# Patient Record
Sex: MALE | Race: WHITE | Employment: FULL TIME | URBAN - METROPOLITAN AREA
[De-identification: names, ages, dates, MRNs, and addresses within clinical notes are randomized per-mention and may not be internally consistent; named-entity substitution may affect disease eponyms.]

---

## 2020-01-11 ENCOUNTER — HOSPITAL ENCOUNTER (EMERGENCY)
Age: 65
Discharge: HOME | End: 2020-01-11
Attending: EMERGENCY MEDICINE
Payer: COMMERCIAL

## 2020-01-11 ENCOUNTER — APPOINTMENT (OUTPATIENT)
Dept: GENERAL RADIOLOGY | Age: 65
End: 2020-01-11
Payer: COMMERCIAL

## 2020-01-11 VITALS
TEMPERATURE: 98 F | DIASTOLIC BLOOD PRESSURE: 77 MMHG | OXYGEN SATURATION: 96 % | HEART RATE: 74 BPM | SYSTOLIC BLOOD PRESSURE: 114 MMHG | WEIGHT: 220 LBS | RESPIRATION RATE: 16 BRPM

## 2020-01-11 LAB
ANION GAP SERPL CALCULATED.3IONS-SCNC: 14 MMOL/L (ref 3–16)
BASOPHILS ABSOLUTE: 0.2 K/UL (ref 0–0.2)
BASOPHILS RELATIVE PERCENT: 3.3 %
BILIRUBIN URINE: NEGATIVE
BLOOD, URINE: NEGATIVE
BUN BLDV-MCNC: 18 MG/DL (ref 7–20)
CALCIUM SERPL-MCNC: 9.8 MG/DL (ref 8.3–10.6)
CHLORIDE BLD-SCNC: 97 MMOL/L (ref 99–110)
CLARITY: CLEAR
CO2: 26 MMOL/L (ref 21–32)
COLOR: YELLOW
CREAT SERPL-MCNC: 1.3 MG/DL (ref 0.8–1.3)
EOSINOPHILS ABSOLUTE: 0.2 K/UL (ref 0–0.6)
EOSINOPHILS RELATIVE PERCENT: 3.5 %
GFR AFRICAN AMERICAN: >60
GFR NON-AFRICAN AMERICAN: 56
GLUCOSE BLD-MCNC: 153 MG/DL (ref 70–99)
GLUCOSE URINE: NEGATIVE MG/DL
HCT VFR BLD CALC: 41.8 % (ref 40.5–52.5)
HEMOGLOBIN: 14.5 G/DL (ref 13.5–17.5)
KETONES, URINE: NEGATIVE MG/DL
LEUKOCYTE ESTERASE, URINE: NEGATIVE
LYMPHOCYTES ABSOLUTE: 0.6 K/UL (ref 1–5.1)
LYMPHOCYTES RELATIVE PERCENT: 12.1 %
MCH RBC QN AUTO: 33.5 PG (ref 26–34)
MCHC RBC AUTO-ENTMCNC: 34.5 G/DL (ref 31–36)
MCV RBC AUTO: 97 FL (ref 80–100)
MICROSCOPIC EXAMINATION: YES
MONOCYTES ABSOLUTE: 0.3 K/UL (ref 0–1.3)
MONOCYTES RELATIVE PERCENT: 5.1 %
NEUTROPHILS ABSOLUTE: 4 K/UL (ref 1.7–7.7)
NEUTROPHILS RELATIVE PERCENT: 76 %
NITRITE, URINE: NEGATIVE
PDW BLD-RTO: 12.8 % (ref 12.4–15.4)
PH UA: 6.5 (ref 5–8)
PLATELET # BLD: 302 K/UL (ref 135–450)
PMV BLD AUTO: 8.1 FL (ref 5–10.5)
POTASSIUM REFLEX MAGNESIUM: 4.1 MMOL/L (ref 3.5–5.1)
PRO-BNP: 68 PG/ML (ref 0–124)
PROTEIN UA: ABNORMAL MG/DL
RBC # BLD: 4.31 M/UL (ref 4.2–5.9)
RBC UA: NORMAL /HPF (ref 0–2)
SODIUM BLD-SCNC: 137 MMOL/L (ref 136–145)
SPECIFIC GRAVITY UA: 1.02 (ref 1–1.03)
TROPONIN: <0.01 NG/ML
TROPONIN: <0.01 NG/ML
URINE TYPE: ABNORMAL
UROBILINOGEN, URINE: 0.2 E.U./DL
WBC # BLD: 5.2 K/UL (ref 4–11)
WBC UA: NORMAL /HPF (ref 0–5)

## 2020-01-11 PROCEDURE — 81001 URINALYSIS AUTO W/SCOPE: CPT

## 2020-01-11 PROCEDURE — 99285 EMERGENCY DEPT VISIT HI MDM: CPT

## 2020-01-11 PROCEDURE — 83880 ASSAY OF NATRIURETIC PEPTIDE: CPT

## 2020-01-11 PROCEDURE — 2580000003 HC RX 258: Performed by: PHYSICIAN ASSISTANT

## 2020-01-11 PROCEDURE — 93005 ELECTROCARDIOGRAM TRACING: CPT | Performed by: PHYSICIAN ASSISTANT

## 2020-01-11 PROCEDURE — 80048 BASIC METABOLIC PNL TOTAL CA: CPT

## 2020-01-11 PROCEDURE — 85025 COMPLETE CBC W/AUTO DIFF WBC: CPT

## 2020-01-11 PROCEDURE — 84484 ASSAY OF TROPONIN QUANT: CPT

## 2020-01-11 PROCEDURE — 71046 X-RAY EXAM CHEST 2 VIEWS: CPT

## 2020-01-11 RX ORDER — 0.9 % SODIUM CHLORIDE 0.9 %
1000 INTRAVENOUS SOLUTION INTRAVENOUS ONCE
Status: COMPLETED | OUTPATIENT
Start: 2020-01-11 | End: 2020-01-11

## 2020-01-11 RX ADMIN — SODIUM CHLORIDE 1000 ML: 9 INJECTION, SOLUTION INTRAVENOUS at 13:08

## 2020-01-11 ASSESSMENT — ENCOUNTER SYMPTOMS
SHORTNESS OF BREATH: 1
NAUSEA: 0
VOMITING: 0
CHEST TIGHTNESS: 0
BACK PAIN: 0
COUGH: 0

## 2020-01-11 NOTE — ED TRIAGE NOTES
Pt hx of MI 3 yrs ago with 3 stents, at work today and had a sudden onset of diaphoresis and weakness, with mild sob, denies any cp n/v. Recently dx w/ flu and cough given an antibiotic at doctors office. Pt does not know his home medications, states he is a daily bourbon drinker. Last drink was last evening.

## 2020-01-11 NOTE — ED NOTES
Bed: A10-10  Expected date:   Expected time:   Means of arrival:   Comments:      Kristel Sky RN  01/11/20 9012

## 2020-01-11 NOTE — ED PROVIDER NOTES
[facility]          PHYSICIAN ASSISTANT NOTE       Date of evaluation: 1/11/2020    Chief Complaint     Excessive Sweating      History of Present Illness     Tri Agee is a 60 y.o. male past medical history of coronary artery disease status post stent placement 3 years ago as well as a history of hypertension, hyperlipidemia presenting to the emergency department with his son for evaluation of exertional dyspnea with diaphoresis and lightheadedness. This morning he was turning a valve which his son states is not easy to do when he returned back to the work truck and was pale, sweaty and notably short of breath. He had similar symptoms with his heart attack 3 years ago. During previous episode he had no chest pain, today he also denies chest pain or complications. Over the last 2 weeks he has had an upper respiratory infection and a few days ago was started on Augmentin by primary care provider. He believes that he is well-hydrated. This morning he had coffee and a [employer]hon[employer] for breakfast.   He does not currently smoke. Has been compliant with taking all medications as prescribed. Review of Systems     Review of Systems   Constitutional: Positive for diaphoresis and fatigue. Negative for chills and fever. Respiratory: Positive for shortness of breath. Negative for cough and chest tightness. Cardiovascular: Negative for chest pain, palpitations and leg swelling. Gastrointestinal: Negative for nausea and vomiting. Musculoskeletal: Negative for back pain and neck pain. Neurological: Positive for light-headedness. Negative for dizziness, syncope and headaches. Psychiatric/Behavioral: Negative for confusion. The patient is not nervous/anxious. Past Medical, Surgical, Family, and Social History     He has a past medical history of CAD (coronary artery disease), Hyperlipidemia, Hypertension, and MI (myocardial infarction) ([facility] [address]   He has a past surgical history that includes Cardiac surgery. His family history is not on file. He reports that he has quit smoking. He has never used smokeless tobacco. He reports current alcohol use. He reports that he does not use drugs. Medications     There are no discharge medications for this patient. Allergies     He has No Known Allergies. Physical Exam     INITIAL VITALS: BP: 117/72, Temp: 98 °F (36.7 °C), Pulse: 82, Resp: 16, SpO2: 96 %  Physical Exam  Vitals signs and nursing note reviewed. Constitutional:       General: He is not in acute distress. Appearance: Normal appearance. He is not ill-appearing. HENT:      Head: Normocephalic and atraumatic. Mouth/Throat:      Mouth: Mucous membranes are moist.   Eyes:      Extraocular Movements: Extraocular movements intact. Pupils: Pupils are equal, round, and reactive to light. Cardiovascular:      Rate and Rhythm: Normal rate and regular rhythm. Pulses: Normal pulses. Heart sounds: Normal heart sounds. No murmur. No friction rub. No gallop. Pulmonary:      Effort: Pulmonary effort is normal. No respiratory distress. Breath sounds: Normal breath sounds. Abdominal:      Tenderness: There is no tenderness. Musculoskeletal: Normal range of motion. General: No swelling. Skin:     General: Skin is warm and dry. Capillary Refill: Capillary refill takes less than 2 seconds. Coloration: Skin is not jaundiced. Neurological:      General: No focal deficit present. Mental Status: He is alert.    Psychiatric:         Mood and Affect: Mood normal.         Behavior: Behavior normal.         Diagnostic Results     EKG   Interpreted in conjunction with emergency department physician No att. providers found  Rhythm: normal sinus   Rate: normal  Axis: right  Ectopy: none  Conduction: normal  ST Segments: normal  T Waves: inversion in  v1 and aVl  Q Waves:none  Clinical Impression: no previous for Negative Negative    Microscopic Examination YES     Urine Type Voided    Microscopic Urinalysis   Result Value Ref Range    WBC, UA 0-2 0 - 5 /HPF    RBC, UA None seen 0 - 2 /HPF   Troponin   Result Value Ref Range    Troponin <0.01 <0.01 ng/mL       ED BEDSIDE ULTRASOUND:  None    RECENT VITALS:  BP: 114/77, Temp: 98 °F (36.7 °C), Pulse: 74, Resp: 16, SpO2: 96 %     Procedures   None    ED Course     Nursing Notes, Past Medical Hx,Past Surgical Hx, Social Hx, Allergies, and Family Hx were reviewed. The patient was given the following medications:  Orders Placed This Encounter   Medications    0.9 % sodium chloride bolus       CONSULTS:  None    ECHO 7/13/19  Study Conclusions  - Left ventricle: The cavity size is normal. There is mild    concentric hypertrophy. Systolic function was normal. The    calculated ejection fraction was in the range of 58% to 63%. Wall    motion was normal; there were no regional wall motion    abnormalities. Doppler parameters are consistent with abnormal    left ventricular relaxation (grade 1 diastolic dysfunction). - Inferior vena cava: The vessel was dilated; the respirophasic    diameter changes were in the normal range (&gt;= 50%); findings are    consistent with mildly elevated central venous pressure. MEDICAL DECISION MAKING / ASSESSMENT / Jennie Vang is a 60 y.o. male resenting to the emergency department for evaluation of diaphoresis, lightheadedness as well as exertional dyspnea which occurred while at work this morning. Upon presentation he was feeling much better than onset of symptoms. Vitals were stable, he was nontoxic in appearance. EKG completed showing normal sinus rhythm with no acute changes to indicate ischemia or infarction. Initial and 3-hour troponin drawn from time of symptoms onset and normal.  Orthostatic vital signs were positive for orthostatic hypotension. He was given 1 L fluid bolus and blood pressure improved.   Patient told nursing staff that he has had diarrhea for the last 5 to 6 days which is likely contributing to times consistent with orthostatic hypotension and mild dehydration. No black or tarry stools, no history of C. difficile colitis no abdominal pain. Workup today otherwise unremarkable. CXR showed no acute cardiopulmonary process. BMP with mild hyperglycemia    Patient requesting discharge and we felt that this was an appropriate disposition. He agreed to call his cardiologist and primary care provider to discuss symptoms today. He agreed to stay well-hydrated and will monitor for changes in diarrhea. No recent exposures or travel to suggest bacterial infection. This patient was also evaluated by the attending physician. All care plans were discussed and agreed upon. Clinical Impression     1. Orthostatic hypotension    2. Diarrhea, unspecified type    3. Lightheadedness        Disposition     PATIENT REFERRED TO:  [facility]  [address] [place] [address]  [phone]    As needed, If symptoms worsen    Odilon Clark [address] [place] [id number]  [phone]    Schedule an appointment as soon as possible for a visit         DISCHARGE MEDICATIONS:  There are no discharge medications for this patient.       DISPOSITION Decision To Discharge 01/11/2020 04:02:10 PM       Gal Kan PA-C  01/11/20 2048

## 2020-01-12 NOTE — ED PROVIDER NOTES
ED Attending Attestation Note     Date of evaluation: 1/11/2020    This patient was seen by the advance practice provider. I have seen and examined the patient, agree with the workup, evaluation, management and diagnosis. The care plan has been discussed. I have reviewed the ECG and concur with the JOSEPH's interpretation. My assessment reveals patient presents with an episode of diaphoresis and lightheadedness. He denies any chest pain or shortness of breath. Recently has had a gastroenteritis. He denies any abdominal pain at this time. On exam patient peers to be in no acute distress and abdomen soft nontender.        Sunita Guerrero MD  01/12/20 9734

## 2020-01-13 LAB
EKG ATRIAL RATE: 82 BPM
EKG DIAGNOSIS: NORMAL
EKG P-R INTERVAL: 202 MS
EKG Q-T INTERVAL: 404 MS
EKG QRS DURATION: 100 MS
EKG QTC CALCULATION (BAZETT): 472 MS
EKG R AXIS: 149 DEGREES
EKG T AXIS: 151 DEGREES
EKG VENTRICULAR RATE: 82 BPM

## 2022-09-21 ENCOUNTER — APPOINTMENT (OUTPATIENT)
Dept: GENERAL RADIOLOGY | Age: 67
DRG: 287 | End: 2022-09-21
Payer: COMMERCIAL

## 2022-09-21 ENCOUNTER — HOSPITAL ENCOUNTER (INPATIENT)
Age: 67
LOS: 1 days | Discharge: HOME | DRG: 287 | End: 2022-09-23
Attending: EMERGENCY MEDICINE | Admitting: INTERNAL MEDICINE
Payer: COMMERCIAL

## 2022-09-21 DIAGNOSIS — R06.09 DOE (DYSPNEA ON EXERTION): Primary | ICD-10-CM

## 2022-09-21 DIAGNOSIS — N17.9 AKI (ACUTE KIDNEY INJURY) (HCC): ICD-10-CM

## 2022-09-21 DIAGNOSIS — R07.89 CHEST DISCOMFORT: ICD-10-CM

## 2022-09-21 PROBLEM — R07.9 CHEST PAIN: Status: ACTIVE | Noted: 2022-09-21

## 2022-09-21 LAB
A/G RATIO: 1.4 (ref 1.1–2.2)
ALBUMIN SERPL-MCNC: 4.4 G/DL (ref 3.4–5)
ALP BLD-CCNC: 44 U/L (ref 40–129)
ALT SERPL-CCNC: 25 U/L (ref 10–40)
ANION GAP SERPL CALCULATED.3IONS-SCNC: 11 MMOL/L (ref 3–16)
AST SERPL-CCNC: 22 U/L (ref 15–37)
BASOPHILS ABSOLUTE: 0.1 K/UL (ref 0–0.2)
BASOPHILS RELATIVE PERCENT: 0.9 %
BILIRUB SERPL-MCNC: 0.9 MG/DL (ref 0–1)
BUN BLDV-MCNC: 14 MG/DL (ref 7–20)
CALCIUM SERPL-MCNC: 9.6 MG/DL (ref 8.3–10.6)
CHLORIDE BLD-SCNC: 100 MMOL/L (ref 99–110)
CO2: 23 MMOL/L (ref 21–32)
CREAT SERPL-MCNC: 2.3 MG/DL (ref 0.8–1.3)
D DIMER: 0.41 UG/ML FEU (ref 0–0.6)
EOSINOPHILS ABSOLUTE: 0.1 K/UL (ref 0–0.6)
EOSINOPHILS RELATIVE PERCENT: 1.6 %
GFR AFRICAN AMERICAN: 35
GFR NON-AFRICAN AMERICAN: 29
GLUCOSE BLD-MCNC: 126 MG/DL (ref 70–99)
HCT VFR BLD CALC: 41.7 % (ref 40.5–52.5)
HEMOGLOBIN: 14.4 G/DL (ref 13.5–17.5)
LYMPHOCYTES ABSOLUTE: 1 K/UL (ref 1–5.1)
LYMPHOCYTES RELATIVE PERCENT: 13.5 %
MCH RBC QN AUTO: 33.5 PG (ref 26–34)
MCHC RBC AUTO-ENTMCNC: 34.5 G/DL (ref 31–36)
MCV RBC AUTO: 96.9 FL (ref 80–100)
MONOCYTES ABSOLUTE: 0.7 K/UL (ref 0–1.3)
MONOCYTES RELATIVE PERCENT: 9 %
NEUTROPHILS ABSOLUTE: 5.7 K/UL (ref 1.7–7.7)
NEUTROPHILS RELATIVE PERCENT: 75 %
PDW BLD-RTO: 13.7 % (ref 12.4–15.4)
PLATELET # BLD: 253 K/UL (ref 135–450)
PMV BLD AUTO: 8.1 FL (ref 5–10.5)
POTASSIUM REFLEX MAGNESIUM: 4.7 MMOL/L (ref 3.5–5.1)
PRO-BNP: 712 PG/ML (ref 0–124)
RBC # BLD: 4.3 M/UL (ref 4.2–5.9)
SODIUM BLD-SCNC: 134 MMOL/L (ref 136–145)
TOTAL PROTEIN: 7.6 G/DL (ref 6.4–8.2)
TROPONIN: <0.01 NG/ML
TROPONIN: <0.01 NG/ML
WBC # BLD: 7.6 K/UL (ref 4–11)

## 2022-09-21 PROCEDURE — 6360000002 HC RX W HCPCS: Performed by: INTERNAL MEDICINE

## 2022-09-21 PROCEDURE — 84484 ASSAY OF TROPONIN QUANT: CPT

## 2022-09-21 PROCEDURE — 83880 ASSAY OF NATRIURETIC PEPTIDE: CPT

## 2022-09-21 PROCEDURE — G0378 HOSPITAL OBSERVATION PER HR: HCPCS

## 2022-09-21 PROCEDURE — 85025 COMPLETE CBC W/AUTO DIFF WBC: CPT

## 2022-09-21 PROCEDURE — 93005 ELECTROCARDIOGRAM TRACING: CPT | Performed by: EMERGENCY MEDICINE

## 2022-09-21 PROCEDURE — 80053 COMPREHEN METABOLIC PANEL: CPT

## 2022-09-21 PROCEDURE — 96372 THER/PROPH/DIAG INJ SC/IM: CPT

## 2022-09-21 PROCEDURE — 94760 N-INVAS EAR/PLS OXIMETRY 1: CPT

## 2022-09-21 PROCEDURE — 2580000003 HC RX 258: Performed by: INTERNAL MEDICINE

## 2022-09-21 PROCEDURE — 85379 FIBRIN DEGRADATION QUANT: CPT

## 2022-09-21 PROCEDURE — 99285 EMERGENCY DEPT VISIT HI MDM: CPT

## 2022-09-21 PROCEDURE — 71045 X-RAY EXAM CHEST 1 VIEW: CPT

## 2022-09-21 RX ORDER — AMLODIPINE BESYLATE 5 MG/1
5 TABLET ORAL DAILY
COMMUNITY

## 2022-09-21 RX ORDER — NITROGLYCERIN 0.4 MG/1
0.4 TABLET SUBLINGUAL EVERY 5 MIN PRN
Status: DISCONTINUED | OUTPATIENT
Start: 2022-09-21 | End: 2022-09-23 | Stop reason: HOSPADM

## 2022-09-21 RX ORDER — ATORVASTATIN CALCIUM 40 MG/1
40 TABLET, FILM COATED ORAL NIGHTLY
Status: DISCONTINUED | OUTPATIENT
Start: 2022-09-21 | End: 2022-09-22

## 2022-09-21 RX ORDER — ONDANSETRON 4 MG/1
4 TABLET, ORALLY DISINTEGRATING ORAL EVERY 8 HOURS PRN
Status: DISCONTINUED | OUTPATIENT
Start: 2022-09-21 | End: 2022-09-22 | Stop reason: SDUPTHER

## 2022-09-21 RX ORDER — LISINOPRIL 40 MG/1
40 TABLET ORAL DAILY
COMMUNITY

## 2022-09-21 RX ORDER — ACETAMINOPHEN 325 MG/1
650 TABLET ORAL EVERY 6 HOURS PRN
Status: DISCONTINUED | OUTPATIENT
Start: 2022-09-21 | End: 2022-09-22 | Stop reason: SDUPTHER

## 2022-09-21 RX ORDER — ASPIRIN 81 MG/1
81 TABLET, CHEWABLE ORAL DAILY
COMMUNITY

## 2022-09-21 RX ORDER — DUTASTERIDE AND TAMSULOSIN HYDROCHLORIDE CAPSULES .5; .4 MG/1; MG/1
CAPSULE ORAL
COMMUNITY

## 2022-09-21 RX ORDER — SODIUM CHLORIDE 0.9 % (FLUSH) 0.9 %
5-40 SYRINGE (ML) INJECTION EVERY 12 HOURS SCHEDULED
Status: DISCONTINUED | OUTPATIENT
Start: 2022-09-21 | End: 2022-09-22 | Stop reason: SDUPTHER

## 2022-09-21 RX ORDER — SODIUM CHLORIDE 0.9 % (FLUSH) 0.9 %
5-40 SYRINGE (ML) INJECTION PRN
Status: DISCONTINUED | OUTPATIENT
Start: 2022-09-21 | End: 2022-09-22 | Stop reason: SDUPTHER

## 2022-09-21 RX ORDER — ONDANSETRON 2 MG/ML
4 INJECTION INTRAMUSCULAR; INTRAVENOUS EVERY 6 HOURS PRN
Status: DISCONTINUED | OUTPATIENT
Start: 2022-09-21 | End: 2022-09-22 | Stop reason: SDUPTHER

## 2022-09-21 RX ORDER — ACETAMINOPHEN 650 MG/1
650 SUPPOSITORY RECTAL EVERY 6 HOURS PRN
Status: DISCONTINUED | OUTPATIENT
Start: 2022-09-21 | End: 2022-09-22 | Stop reason: SDUPTHER

## 2022-09-21 RX ORDER — POLYETHYLENE GLYCOL 3350 17 G/17G
17 POWDER, FOR SOLUTION ORAL DAILY PRN
Status: DISCONTINUED | OUTPATIENT
Start: 2022-09-21 | End: 2022-09-22 | Stop reason: SDUPTHER

## 2022-09-21 RX ORDER — ATORVASTATIN CALCIUM 40 MG/1
40 TABLET, FILM COATED ORAL DAILY
COMMUNITY

## 2022-09-21 RX ORDER — ASPIRIN 81 MG/1
81 TABLET, CHEWABLE ORAL DAILY
Status: DISCONTINUED | OUTPATIENT
Start: 2022-09-22 | End: 2022-09-22

## 2022-09-21 RX ORDER — LANSOPRAZOLE 15 MG/1
15 CAPSULE, DELAYED RELEASE ORAL DAILY
COMMUNITY

## 2022-09-21 RX ORDER — SODIUM CHLORIDE 9 MG/ML
INJECTION, SOLUTION INTRAVENOUS PRN
Status: DISCONTINUED | OUTPATIENT
Start: 2022-09-21 | End: 2022-09-22 | Stop reason: SDUPTHER

## 2022-09-21 RX ORDER — CARVEDILOL 25 MG/1
25 TABLET ORAL 2 TIMES DAILY WITH MEALS
COMMUNITY

## 2022-09-21 RX ORDER — FENOFIBRATE 67 MG/1
67 CAPSULE ORAL 2 TIMES DAILY
COMMUNITY

## 2022-09-21 RX ORDER — ENOXAPARIN SODIUM 100 MG/ML
1 INJECTION SUBCUTANEOUS 2 TIMES DAILY
Status: DISCONTINUED | OUTPATIENT
Start: 2022-09-21 | End: 2022-09-22

## 2022-09-21 RX ORDER — TADALAFIL 10 MG/1
10 TABLET ORAL PRN
COMMUNITY

## 2022-09-21 RX ADMIN — SODIUM CHLORIDE, PRESERVATIVE FREE 10 ML: 5 INJECTION INTRAVENOUS at 23:06

## 2022-09-21 RX ADMIN — ENOXAPARIN SODIUM 100 MG: 100 INJECTION SUBCUTANEOUS at 23:05

## 2022-09-21 ASSESSMENT — PAIN - FUNCTIONAL ASSESSMENT: PAIN_FUNCTIONAL_ASSESSMENT: NONE - DENIES PAIN

## 2022-09-21 ASSESSMENT — ENCOUNTER SYMPTOMS
GASTROINTESTINAL NEGATIVE: 1
COUGH: 1
SHORTNESS OF BREATH: 1

## 2022-09-21 NOTE — H&P
HOSPITAL MEDICINE  HISTORY & PHYSICAL        Date of Admission: 9/21/2022  MRN: [id number]  Date of Service: 09/21/22       CHIEF COMPLAINT:  chest pain         HISTORY OF PRESENT ILLNESS:     Colt Salinas is a 74 y.o. male with a PMHx listed below who presented to the ED for evaluation of chest pain. Pt with episode of chest discomfort and associated dyspnea occurring earlier this morning after getting ready for work. Had just been moving some equipment into his truck and was sitting in the 's seat when he felt left sided chest discomfort without radiation. He had some associated shortness of breath and lightheadedness. He opted to go home to rest.  The episode lasted about 1-2 hours, resolving spontaneously. He had a similar episode about a week ago, yet that episode last only for 15-30 minutes. He has a prior hx of an MI in 2016 with stent placement x3 (RCA, prox LAD) in [place], [place]. He was previously on DAPT, but now is maintained on a daily ASA. He otherwise denies any fevers, chills, N/V/D, abdominal pain, or leg swelling. He has not had any recurrence of the chest pain since arriving to the hospital.     In the ED:   - VS stable  - Labs: Cr 2.3, proBNP 712  - CXR: no acute findings  - EKG: NSR, borderline 1st AV block, Q wave inf leads           PAST MEDICAL HX:  Past Medical History:   Diagnosis Date    CAD (coronary artery disease)     Hyperlipidemia     Hypertension     MI (myocardial infarction) ([address]     3 stents placed       SURGICAL HX:  Past Surgical History:   Procedure Laterality Date    CARDIAC SURGERY         FAMILY HX:  History reviewed. No pertinent family history.     SOCIAL HX:  Social History     Tobacco Use    Smoking status: Former    Smokeless tobacco: Never   Substance Use Topics    Alcohol use: Yes     Comment: daily bourbon    Drug use: Never        ALLERGIES:  No Known Allergies      MEDICATIONS:     Current Facility-Administered Medications: than 2 seconds. Neurological:      General: No focal deficit present. Mental Status: He is alert and oriented to person, place, and time. Psychiatric:         Behavior: Behavior normal.       LABS / IMAGING:     Recent Labs     09/21/22 1827   WBC 7.6   HGB 14.4   HCT 41.7        Recent Labs     09/21/22 1827   *   K 4.7      CO2 23   BUN 14   CREATININE 2.3*   CALCIUM 9.6     Recent Labs     09/21/22 1827   AST 22   ALT 25   BILITOT 0.9   ALKPHOS 44     No results for input(s): INR in the last 72 hours. Recent Labs     09/21/22 1827   TROPONINI <0.01       XR CHEST PORTABLE   Final Result   No acute cardiopulmonary findings. ASSESSMENT / PLAN:     Chest pain / ACS rule out  CAD (s/p stents 2016)  HTN    Anginal type pain, somewhat atypical in presentation, though with increased duration and hx of ischemic disease. Initial troponin negative. No further CP since arrival.     - trend troponin  - check lipids, A1c, TSH, Mg   - ECHO in AM  - continue daily ASA  - lovenox 1mg/kg BID  - continue home BB, ACEi  - nitroglycerin SL prn   - repeat EKG PRN for chest pain or troponin increases  - keep O2 > 92%  - cardiology consulted      Dispo: Admit to Observation telemetry. Expected LOS less than 2 midnights.   PPx:      lovenox  PT/OT:    Not indicated   Code status:  Full         Dung Pedersen MD  Hospitalist

## 2022-09-21 NOTE — ED PROVIDER NOTES
[facility] Emergency Department    Carolina Perez MD, am the primary clinician of record. CHIEF COMPLAINT  Chief Complaint   Patient presents with    Chest Pain     Pt states that he has had chest discomfort in the L side of his chest for the past few weeks. Pt has a hx of 3 stents. Pt states that he is easily fatigued, but denies any pain, nausea or vomiting. HISTORY OF PRESENT ILLNESS  Anthony Ferris is a 74 y.o. male  who presents to the ED complaining of left-sided chest vague discomfort for a few weeks but more notably is exertional dyspnea and sometimes dyspnea at rest.  Intermittently he gets lightheaded with it as well. He reports fatigability is not normal for him over the past few weeks has been progressive. He does have a history of CAD with stenting, hypertension hyperlipidemia and no recent cardiac work-up in the past 2+ years. He does take daily aspirin every day. He denies any leg swelling belly pain nausea vomiting diarrhea fevers chills or coughing. He is concerned it may be his heart and was sent here by urgent care. No other complaints, modifying factors or associated symptoms. I have reviewed the following from the nursing documentation. Past Medical History:   Diagnosis Date    CAD (coronary artery disease)     Hyperlipidemia     Hypertension     MI (myocardial infarction) ([address]     3 stents placed     Past Surgical History:   Procedure Laterality Date    CARDIAC SURGERY       No family history on file.   Social History     Socioeconomic History    Marital status:      Spouse name: Not on file    Number of children: Not on file    Years of education: Not on file    Highest education level: Not on file   Occupational History    Not on file   Tobacco Use    Smoking status: Former    Smokeless tobacco: Never   Vaping Use    Vaping Use: Not on file   Substance and Sexual Activity    Alcohol use: Yes     Comment: daily bourbon    Drug use: Never    Sexual activity: Not on file   Other Topics Concern    Not on file   Social History Narrative    Not on file     Social Determinants of Health     Financial Resource Strain: Not on file   Food Insecurity: Not on file   Transportation Needs: Not on file   Physical Activity: Not on file   Stress: Not on file   Social Connections: Not on file   Intimate Partner Violence: Not on file   Housing Stability: Not on file     Current Facility-Administered Medications   Medication Dose Route Frequency Provider Last Rate Last Admin    nitroglycerin (NITRO-BID) 2 % ointment 1 inch  1 inch Topical Once Manda Graf MD         No current outpatient medications on file. No Known Allergies    REVIEW OF SYSTEMS  10 systems reviewed, pertinent positives per HPI otherwise noted to be negative. PHYSICAL EXAM  /72   Pulse 66   Temp 98.5 °F (36.9 °C)   Resp 16   Ht 5' 10\" (1.778 m)   Wt 210 lb (95.3 kg)   SpO2 97%   BMI 30.13 kg/m²    GENERAL APPEARANCE: Awake and alert. Cooperative. no distress. HENT: Normocephalic. Atraumatic. Mucous membranes are moist  NECK: Supple. EYES: PERRL. EOM's grossly intact. HEART/CHEST: RRR. No murmurs. No chest wall tenderness. LUNGS: Respirations unlabored. CTAB. Good air exchange. Speaking comfortably in full sentences. ABDOMEN: No tenderness. Soft. Non-distended. No masses. No organomegaly. No guarding or rebound. Normal bowel sounds throughout. MUSCULOSKELETAL: No extremity edema. Compartments soft. No deformity. No tenderness in the extremities. All extremities neurovascularly intact. SKIN: Warm and dry. No acute rashes. NEUROLOGICAL: Alert and oriented. CN's 2-12 intact. No gross facial drooping. Strength 5/5, sensation intact. 2 plus DTR's in knees bilaterally. Gait normal.  PSYCHIATRIC: Normal mood and affect. LABS  I have reviewed all labs for this visit.    Results for orders placed or performed during the hospital encounter of 09/21/22   CBC with Auto Differential   Result Value Ref Range    WBC 7.6 4.0 - 11.0 K/uL    RBC 4.30 4.20 - 5.90 M/uL    Hemoglobin 14.4 13.5 - 17.5 g/dL    Hematocrit 41.7 40.5 - 52.5 %    MCV 96.9 80.0 - 100.0 fL    MCH 33.5 26.0 - 34.0 pg    MCHC 34.5 31.0 - 36.0 g/dL    RDW 13.7 12.4 - 15.4 %    Platelets 603 227 - 328 K/uL    MPV 8.1 5.0 - 10.5 fL    Neutrophils % 75.0 %    Lymphocytes % 13.5 %    Monocytes % 9.0 %    Eosinophils % 1.6 %    Basophils % 0.9 %    Neutrophils Absolute 5.7 1.7 - 7.7 K/uL    Lymphocytes Absolute 1.0 1.0 - 5.1 K/uL    Monocytes Absolute 0.7 0.0 - 1.3 K/uL    Eosinophils Absolute 0.1 0.0 - 0.6 K/uL    Basophils Absolute 0.1 0.0 - 0.2 K/uL   Comprehensive Metabolic Panel w/ Reflex to MG   Result Value Ref Range    Sodium 134 (L) 136 - 145 mmol/L    Potassium reflex Magnesium 4.7 3.5 - 5.1 mmol/L    Chloride 100 99 - 110 mmol/L    CO2 23 21 - 32 mmol/L    Anion Gap 11 3 - 16    Glucose 126 (H) 70 - 99 mg/dL    BUN 14 7 - 20 mg/dL    Creatinine 2.3 (H) 0.8 - 1.3 mg/dL    GFR Non-African American 29 (A) >60    GFR  35 (A) >60    Calcium 9.6 8.3 - 10.6 mg/dL    Total Protein 7.6 6.4 - 8.2 g/dL    Albumin 4.4 3.4 - 5.0 g/dL    Albumin/Globulin Ratio 1.4 1.1 - 2.2    Total Bilirubin 0.9 0.0 - 1.0 mg/dL    Alkaline Phosphatase 44 40 - 129 U/L    ALT 25 10 - 40 U/L    AST 22 15 - 37 U/L   D-Dimer, Quantitative   Result Value Ref Range    D-Dimer, Quant 0.41 0.00 - 0.60 ug/mL FEU   Troponin   Result Value Ref Range    Troponin <0.01 <0.01 ng/mL   Brain Natriuretic Peptide   Result Value Ref Range    Pro- (H) 0 - 124 pg/mL   EKG 12 Lead   Result Value Ref Range    Ventricular Rate 62 BPM    Atrial Rate 62 BPM    P-R Interval 206 ms    QRS Duration 94 ms    Q-T Interval 454 ms    QTc Calculation (Bazett) 460 ms    P Axis 8 degrees    R Axis 26 degrees    T Axis 33 degrees    Diagnosis       Normal sinus rhythmPossible Inferior infarct , age undetermined       The 12 lead EKG was interpreted by me as follows:  Rate: normal with a rate of 62  Rhythm: sinus  Axis: normal  Intervals: normal MN, narrow QRS, normal QTc  ST segments: no ST elevations or depressions  T waves: no abnormal inversions  Non-specific T wave changes: not present  Prior EKG comparison: EKG dated 1/11/20 is not significantly different    RADIOLOGY    XR CHEST PORTABLE    Result Date: 9/21/2022  EXAMINATION: ONE XRAY VIEW OF THE CHEST 9/21/2022 6:29 pm COMPARISON: Chest x-ray dated 11 January 2020 HISTORY: ORDERING SYSTEM PROVIDED HISTORY: sob TECHNOLOGIST PROVIDED HISTORY: Reason for exam:->sob Reason for Exam: sob FINDINGS: Mild cardiomegaly. No acute airspace infiltrate. No pneumothorax or pleural effusion. No acute cardiopulmonary findings. ED COURSE/MDM  Patient seen and evaluated. Old records reviewed. Labs and imaging reviewed and results discussed with patient. After initial evaluation, differential diagnostic considerations included: acute coronary syndrome, pulmonary embolism, COPD/asthma, pneumonia, musculoskeletal, reflux/PUD/gastritis, pneumothorax, CHF, thoracic aortic dissection, anxiety      The patient's ED workup was notable for chest discomfort and exertional dyspnea. Patient has a negative D-dimer with low risk for PE to rule this out. Chest x-ray is clear. Troponin is negative and EKG is not acutely ischemic however he has a known history of CAD with stents and history highly suggesting angina so he does warrant admission for ACS rule out. He has already taken his aspirin today and will be given Nitropaste here as well. He has a borderline BETTY compared to his baseline although last labs checked were roughly 2 years ago. Is this patient to be included in the SEP-1 Core Measure? No   Exclusion criteria - the patient is NOT to be included for SEP-1 Core Measure due to:   Infection is not suspected      During the patient's ED course, the patient was given:  Medications nitroglycerin (NITRO-BID) 2 % ointment 1 inch (has no administration in time range)        CLINICAL IMPRESSION  1. HU (dyspnea on exertion)    2. BETTY (acute kidney injury) ([facility] [address]    3. Chest discomfort        Blood pressure 116/72, pulse 66, temperature 98.5 °F (36.9 °C), resp. rate 16, height 5' 10\" (1.778 m), weight 210 lb (95.3 kg), SpO2 97 %. [facility] was admitted in fair condition. The plan is to admit to the hospital at this time under the hospitalist service. Hospitalist accepted the patient and will take over the patient's care. DISCLAIMER: This chart was created using Dragon dictation software. Efforts were made by me to ensure accuracy, however some errors may be present due to limitations of this technology and occasionally words are not transcribed correctly.         Ravindra Aranda MD  09/21/22 0141

## 2022-09-22 PROBLEM — F10.10 ALCOHOL ABUSE: Status: ACTIVE | Noted: 2022-09-22

## 2022-09-22 LAB
ANION GAP SERPL CALCULATED.3IONS-SCNC: 11 MMOL/L (ref 3–16)
BUN BLDV-MCNC: 20 MG/DL (ref 7–20)
CALCIUM SERPL-MCNC: 9.3 MG/DL (ref 8.3–10.6)
CHLORIDE BLD-SCNC: 101 MMOL/L (ref 99–110)
CHOLESTEROL, TOTAL: 143 MG/DL (ref 0–199)
CO2: 23 MMOL/L (ref 21–32)
CREAT SERPL-MCNC: 1.9 MG/DL (ref 0.8–1.3)
EKG ATRIAL RATE: 62 BPM
EKG DIAGNOSIS: NORMAL
EKG P AXIS: 8 DEGREES
EKG P-R INTERVAL: 206 MS
EKG Q-T INTERVAL: 454 MS
EKG QRS DURATION: 94 MS
EKG QTC CALCULATION (BAZETT): 460 MS
EKG R AXIS: 26 DEGREES
EKG T AXIS: 33 DEGREES
EKG VENTRICULAR RATE: 62 BPM
ESTIMATED AVERAGE GLUCOSE: 96.8 MG/DL
GFR AFRICAN AMERICAN: 43
GFR NON-AFRICAN AMERICAN: 36
GLUCOSE BLD-MCNC: 104 MG/DL (ref 70–99)
HBA1C MFR BLD: 5 %
HCT VFR BLD CALC: 40.9 % (ref 40.5–52.5)
HDLC SERPL-MCNC: 28 MG/DL (ref 40–60)
HEMOGLOBIN: 13.8 G/DL (ref 13.5–17.5)
LDL CHOLESTEROL CALCULATED: 64 MG/DL
LV EF: 53 %
LV EF: 62 %
LVEF MODALITY: NORMAL
LVEF MODALITY: NORMAL
MAGNESIUM: 1.8 MG/DL (ref 1.8–2.4)
MCH RBC QN AUTO: 32.9 PG (ref 26–34)
MCHC RBC AUTO-ENTMCNC: 33.8 G/DL (ref 31–36)
MCV RBC AUTO: 97.3 FL (ref 80–100)
PDW BLD-RTO: 13.8 % (ref 12.4–15.4)
PLATELET # BLD: 207 K/UL (ref 135–450)
PMV BLD AUTO: 7.5 FL (ref 5–10.5)
POTASSIUM SERPL-SCNC: 4.1 MMOL/L (ref 3.5–5.1)
RBC # BLD: 4.21 M/UL (ref 4.2–5.9)
SODIUM BLD-SCNC: 135 MMOL/L (ref 136–145)
TRIGL SERPL-MCNC: 253 MG/DL (ref 0–150)
TROPONIN: <0.01 NG/ML
TROPONIN: <0.01 NG/ML
TSH REFLEX: 3.19 UIU/ML (ref 0.27–4.2)
VLDLC SERPL CALC-MCNC: 51 MG/DL
WBC # BLD: 6 K/UL (ref 4–11)

## 2022-09-22 PROCEDURE — 3430000000 HC RX DIAGNOSTIC RADIOPHARMACEUTICAL: Performed by: INTERNAL MEDICINE

## 2022-09-22 PROCEDURE — 36415 COLL VENOUS BLD VENIPUNCTURE: CPT

## 2022-09-22 PROCEDURE — 6370000000 HC RX 637 (ALT 250 FOR IP): Performed by: INTERNAL MEDICINE

## 2022-09-22 PROCEDURE — 84443 ASSAY THYROID STIM HORMONE: CPT

## 2022-09-22 PROCEDURE — 93010 ELECTROCARDIOGRAM REPORT: CPT | Performed by: INTERNAL MEDICINE

## 2022-09-22 PROCEDURE — 1200000000 HC SEMI PRIVATE

## 2022-09-22 PROCEDURE — 2580000003 HC RX 258: Performed by: INTERNAL MEDICINE

## 2022-09-22 PROCEDURE — 99222 1ST HOSP IP/OBS MODERATE 55: CPT | Performed by: INTERNAL MEDICINE

## 2022-09-22 PROCEDURE — 83735 ASSAY OF MAGNESIUM: CPT

## 2022-09-22 PROCEDURE — 80061 LIPID PANEL: CPT

## 2022-09-22 PROCEDURE — 84484 ASSAY OF TROPONIN QUANT: CPT

## 2022-09-22 PROCEDURE — G0378 HOSPITAL OBSERVATION PER HR: HCPCS

## 2022-09-22 PROCEDURE — 78452 HT MUSCLE IMAGE SPECT MULT: CPT | Performed by: INTERNAL MEDICINE

## 2022-09-22 PROCEDURE — 93017 CV STRESS TEST TRACING ONLY: CPT | Performed by: INTERNAL MEDICINE

## 2022-09-22 PROCEDURE — 85027 COMPLETE CBC AUTOMATED: CPT

## 2022-09-22 PROCEDURE — 93306 TTE W/DOPPLER COMPLETE: CPT

## 2022-09-22 PROCEDURE — 93005 ELECTROCARDIOGRAM TRACING: CPT | Performed by: INTERNAL MEDICINE

## 2022-09-22 PROCEDURE — A9502 TC99M TETROFOSMIN: HCPCS | Performed by: INTERNAL MEDICINE

## 2022-09-22 PROCEDURE — 83036 HEMOGLOBIN GLYCOSYLATED A1C: CPT

## 2022-09-22 PROCEDURE — 80048 BASIC METABOLIC PNL TOTAL CA: CPT

## 2022-09-22 RX ORDER — LISINOPRIL 20 MG/1
20 TABLET ORAL DAILY
Status: DISCONTINUED | OUTPATIENT
Start: 2022-09-22 | End: 2022-09-23 | Stop reason: HOSPADM

## 2022-09-22 RX ORDER — LORAZEPAM 1 MG/1
2 TABLET ORAL
Status: DISCONTINUED | OUTPATIENT
Start: 2022-09-22 | End: 2022-09-22 | Stop reason: RX

## 2022-09-22 RX ORDER — LORAZEPAM 1 MG/1
2 TABLET ORAL
Status: DISCONTINUED | OUTPATIENT
Start: 2022-09-22 | End: 2022-09-23 | Stop reason: HOSPADM

## 2022-09-22 RX ORDER — LORAZEPAM 1 MG/1
1 TABLET ORAL
Status: DISCONTINUED | OUTPATIENT
Start: 2022-09-22 | End: 2022-09-22 | Stop reason: RX

## 2022-09-22 RX ORDER — LORAZEPAM 2 MG/ML
1 CONCENTRATE ORAL
Status: DISCONTINUED | OUTPATIENT
Start: 2022-09-22 | End: 2022-09-23 | Stop reason: HOSPADM

## 2022-09-22 RX ORDER — SODIUM CHLORIDE 0.9 % (FLUSH) 0.9 %
5-40 SYRINGE (ML) INJECTION EVERY 12 HOURS SCHEDULED
Status: DISCONTINUED | OUTPATIENT
Start: 2022-09-22 | End: 2022-09-23 | Stop reason: HOSPADM

## 2022-09-22 RX ORDER — ONDANSETRON 4 MG/1
4 TABLET, ORALLY DISINTEGRATING ORAL EVERY 8 HOURS PRN
Status: DISCONTINUED | OUTPATIENT
Start: 2022-09-22 | End: 2022-09-23 | Stop reason: HOSPADM

## 2022-09-22 RX ORDER — ASPIRIN 81 MG/1
81 TABLET, CHEWABLE ORAL DAILY
Status: DISCONTINUED | OUTPATIENT
Start: 2022-09-22 | End: 2022-09-23 | Stop reason: HOSPADM

## 2022-09-22 RX ORDER — AMLODIPINE BESYLATE 5 MG/1
5 TABLET ORAL DAILY
Status: DISCONTINUED | OUTPATIENT
Start: 2022-09-22 | End: 2022-09-23

## 2022-09-22 RX ORDER — ENOXAPARIN SODIUM 100 MG/ML
40 INJECTION SUBCUTANEOUS DAILY
Status: DISCONTINUED | OUTPATIENT
Start: 2022-09-22 | End: 2022-09-23 | Stop reason: HOSPADM

## 2022-09-22 RX ORDER — SODIUM CHLORIDE 9 MG/ML
INJECTION, SOLUTION INTRAVENOUS CONTINUOUS
Status: DISCONTINUED | OUTPATIENT
Start: 2022-09-22 | End: 2022-09-23 | Stop reason: HOSPADM

## 2022-09-22 RX ORDER — ACETAMINOPHEN 650 MG/1
650 SUPPOSITORY RECTAL EVERY 6 HOURS PRN
Status: DISCONTINUED | OUTPATIENT
Start: 2022-09-22 | End: 2022-09-23 | Stop reason: HOSPADM

## 2022-09-22 RX ORDER — CARVEDILOL 25 MG/1
25 TABLET ORAL 2 TIMES DAILY WITH MEALS
Status: DISCONTINUED | OUTPATIENT
Start: 2022-09-22 | End: 2022-09-23 | Stop reason: HOSPADM

## 2022-09-22 RX ORDER — LORAZEPAM 1 MG/1
4 TABLET ORAL
Status: DISCONTINUED | OUTPATIENT
Start: 2022-09-22 | End: 2022-09-22 | Stop reason: RX

## 2022-09-22 RX ORDER — LORAZEPAM 1 MG/1
4 TABLET ORAL
Status: DISCONTINUED | OUTPATIENT
Start: 2022-09-22 | End: 2022-09-23 | Stop reason: HOSPADM

## 2022-09-22 RX ORDER — LORAZEPAM 1 MG/1
3 TABLET ORAL
Status: DISCONTINUED | OUTPATIENT
Start: 2022-09-22 | End: 2022-09-23 | Stop reason: HOSPADM

## 2022-09-22 RX ORDER — POLYETHYLENE GLYCOL 3350 17 G/17G
17 POWDER, FOR SOLUTION ORAL DAILY PRN
Status: DISCONTINUED | OUTPATIENT
Start: 2022-09-22 | End: 2022-09-23 | Stop reason: HOSPADM

## 2022-09-22 RX ORDER — LORAZEPAM 1 MG/1
3 TABLET ORAL
Status: DISCONTINUED | OUTPATIENT
Start: 2022-09-22 | End: 2022-09-22 | Stop reason: RX

## 2022-09-22 RX ORDER — ONDANSETRON 2 MG/ML
4 INJECTION INTRAMUSCULAR; INTRAVENOUS EVERY 6 HOURS PRN
Status: DISCONTINUED | OUTPATIENT
Start: 2022-09-22 | End: 2022-09-23 | Stop reason: HOSPADM

## 2022-09-22 RX ORDER — LORAZEPAM 2 MG/ML
1 INJECTION INTRAMUSCULAR
Status: DISCONTINUED | OUTPATIENT
Start: 2022-09-22 | End: 2022-09-22 | Stop reason: RX

## 2022-09-22 RX ORDER — ACETAMINOPHEN 325 MG/1
650 TABLET ORAL EVERY 6 HOURS PRN
Status: DISCONTINUED | OUTPATIENT
Start: 2022-09-22 | End: 2022-09-23 | Stop reason: HOSPADM

## 2022-09-22 RX ORDER — SODIUM CHLORIDE 0.9 % (FLUSH) 0.9 %
5-40 SYRINGE (ML) INJECTION PRN
Status: DISCONTINUED | OUTPATIENT
Start: 2022-09-22 | End: 2022-09-23 | Stop reason: HOSPADM

## 2022-09-22 RX ORDER — GAUZE BANDAGE 2" X 2"
100 BANDAGE TOPICAL DAILY
Status: DISCONTINUED | OUTPATIENT
Start: 2022-09-22 | End: 2022-09-23 | Stop reason: HOSPADM

## 2022-09-22 RX ORDER — LORAZEPAM 2 MG/ML
3 INJECTION INTRAMUSCULAR
Status: DISCONTINUED | OUTPATIENT
Start: 2022-09-22 | End: 2022-09-22 | Stop reason: RX

## 2022-09-22 RX ORDER — LORAZEPAM 2 MG/ML
4 CONCENTRATE ORAL
Status: DISCONTINUED | OUTPATIENT
Start: 2022-09-22 | End: 2022-09-23 | Stop reason: HOSPADM

## 2022-09-22 RX ORDER — ATORVASTATIN CALCIUM 40 MG/1
40 TABLET, FILM COATED ORAL DAILY
Status: DISCONTINUED | OUTPATIENT
Start: 2022-09-22 | End: 2022-09-23 | Stop reason: HOSPADM

## 2022-09-22 RX ORDER — LORAZEPAM 1 MG/1
1 TABLET ORAL
Status: DISCONTINUED | OUTPATIENT
Start: 2022-09-22 | End: 2022-09-23 | Stop reason: HOSPADM

## 2022-09-22 RX ORDER — LORAZEPAM 2 MG/ML
2 INJECTION INTRAMUSCULAR
Status: DISCONTINUED | OUTPATIENT
Start: 2022-09-22 | End: 2022-09-22 | Stop reason: RX

## 2022-09-22 RX ORDER — PANTOPRAZOLE SODIUM 40 MG/1
40 TABLET, DELAYED RELEASE ORAL
Status: DISCONTINUED | OUTPATIENT
Start: 2022-09-22 | End: 2022-09-23 | Stop reason: HOSPADM

## 2022-09-22 RX ORDER — LORAZEPAM 2 MG/ML
4 INJECTION INTRAMUSCULAR
Status: DISCONTINUED | OUTPATIENT
Start: 2022-09-22 | End: 2022-09-22 | Stop reason: RX

## 2022-09-22 RX ORDER — SODIUM CHLORIDE 9 MG/ML
INJECTION, SOLUTION INTRAVENOUS PRN
Status: DISCONTINUED | OUTPATIENT
Start: 2022-09-22 | End: 2022-09-23 | Stop reason: HOSPADM

## 2022-09-22 RX ORDER — LORAZEPAM 2 MG/ML
3 CONCENTRATE ORAL
Status: DISCONTINUED | OUTPATIENT
Start: 2022-09-22 | End: 2022-09-23 | Stop reason: HOSPADM

## 2022-09-22 RX ORDER — LORAZEPAM 2 MG/ML
2 CONCENTRATE ORAL
Status: DISCONTINUED | OUTPATIENT
Start: 2022-09-22 | End: 2022-09-23 | Stop reason: HOSPADM

## 2022-09-22 RX ADMIN — ATORVASTATIN CALCIUM 40 MG: 40 TABLET, FILM COATED ORAL at 10:34

## 2022-09-22 RX ADMIN — PANTOPRAZOLE SODIUM 40 MG: 40 TABLET, DELAYED RELEASE ORAL at 10:33

## 2022-09-22 RX ADMIN — ASPIRIN 81 MG 81 MG: 81 TABLET ORAL at 10:34

## 2022-09-22 RX ADMIN — SERTRALINE 50 MG: 50 TABLET, FILM COATED ORAL at 10:34

## 2022-09-22 RX ADMIN — TETROFOSMIN 10 MILLICURIE: 1.38 INJECTION, POWDER, LYOPHILIZED, FOR SOLUTION INTRAVENOUS at 11:14

## 2022-09-22 RX ADMIN — SODIUM CHLORIDE: 9 INJECTION, SOLUTION INTRAVENOUS at 10:45

## 2022-09-22 RX ADMIN — TETROFOSMIN 30 MILLICURIE: 1.38 INJECTION, POWDER, LYOPHILIZED, FOR SOLUTION INTRAVENOUS at 12:04

## 2022-09-22 RX ADMIN — SODIUM CHLORIDE, PRESERVATIVE FREE 10 ML: 5 INJECTION INTRAVENOUS at 10:34

## 2022-09-22 RX ADMIN — AMLODIPINE BESYLATE 5 MG: 5 TABLET ORAL at 10:34

## 2022-09-22 RX ADMIN — LISINOPRIL 20 MG: 20 TABLET ORAL at 10:34

## 2022-09-22 RX ADMIN — Medication 100 MG: at 18:02

## 2022-09-22 NOTE — PLAN OF CARE
Problem: Cardiovascular - Adult  Goal: Maintains optimal cardiac output and hemodynamic stability  Outcome: Progressing  Note: Troponin negative x2. VSS. Pt denies chest pain. Bed rest with bathroom privileges. I&O and daily weight recording. Npo at midnight for possible testing in am.  Telemetry monitoring.

## 2022-09-22 NOTE — PROGRESS NOTES
Pt admitted to room 3325. Pt is alert and oriented x4, ambulates independently in room. Pt denies chest pain at this time. Plan of care discussed with pt and wife at bed side. Rest encouraged. Admission documentation and assessments completed. VSS. Troponin negative x2. Telemetry monitoring. Medications given per MAR. Snacks and drink provided per pt request. Pt is npo at midnight. Will continue to monitor.

## 2022-09-22 NOTE — CARE COORDINATION
Discharge Planning Assessment. Patient's chart reviewed for discharge planning needs; admitted from home, A&O, independent . Discussed with patients nurse and requested that case management be notified if discharge needs are identified. Patient has active insurance with [employer]. Dr Adriana Lee is listed as the PCP. Family will transport patient home on discharge. Case management will continue to follow progress and update discharge plan as needed.

## 2022-09-22 NOTE — CONSULTS
[address]  H+P  Consult  OP Visit  FU Visit   CC HPI   CP Presents to hospital with cp. CP intermittent for several weeks. Pressure, associated sob, worse with exertion. Troponin negative. EKG without acute changes. Hx of PCI in [place]. HISTORY/ALLERGY/ROS   MEDHx  has a past medical history of CAD (coronary artery disease), Hyperlipidemia, Hypertension, and MI (myocardial infarction) ([address] SURGHx  has a past surgical history that includes Cardiac surgery. SOCHx  reports that he has quit smoking. He has never used smokeless tobacco. He reports current alcohol use. He reports that he does not use drugs. FAMHx family history is not on file. ALLERG Patient has no known allergies.    ROS Full ROS obtained and negative except as mentioned in HPI   MEDICATIONS   Current Facility-Administered Medications   Medication Dose Route Frequency Provider Last Rate Last Admin    amLODIPine (NORVASC) tablet 5 mg  5 mg Oral Daily Montana Torres MD        aspirin chewable tablet 81 mg  81 mg Oral Daily Montana Torres MD        atorvastatin (LIPITOR) tablet 40 mg  40 mg Oral Daily Montana Torres MD        carvedilol (COREG) tablet 25 mg  25 mg Oral BID WC Montana Torres MD        pantoprazole (PROTONIX) tablet 40 mg  40 mg Oral QAM AC Montana Torres MD        lisinopril (PRINIVIL;ZESTRIL) tablet 20 mg  20 mg Oral Daily Montana Torres MD        sertraline (ZOLOFT) tablet 50 mg  50 mg Oral Daily Montana Torres MD        nitroglycerin (NITRO-BID) 2 % ointment 1 inch  1 inch Topical Once Montana Torres MD        sodium chloride flush 0.9 % injection 5-40 mL  5-40 mL IntraVENous 2 times per day Montana Torres MD   10 mL at 09/21/22 2306    sodium chloride flush 0.9 % injection 5-40 mL  5-40 mL IntraVENous PRN Montana Torres MD        0.9 % sodium chloride infusion   IntraVENous PRN Montana Torres MD        ondansetron (ZOFRAN-ODT) disintegrating tablet 4 mg  4 mg Oral Q8H PRN Montana Torres MD        Or ondansetron (ZOFRAN) injection 4 mg  4 mg IntraVENous Q6H PRN Malvin Mauricio MD        acetaminophen (TYLENOL) tablet 650 mg  650 mg Oral Q6H PRN Malvin Mauricio MD        Or    acetaminophen (TYLENOL) suppository 650 mg  650 mg Rectal Q6H PRN Malvin Mauricio MD        polyethylene glycol (GLYCOLAX) packet 17 g  17 g Oral Daily PRN Malvin Mauricio MD        nitroGLYCERIN (NITROSTAT) SL tablet 0.4 mg  0.4 mg SubLINGual Q5 Min PRN Malvin Mauricio MD        enoxaparin (LOVENOX) injection 100 mg  1 mg/kg SubCUTAneous BID Malvin Mauricio MD   100 mg at 09/21/22 2305      PHYSICAL EXAM   Vitals Vitals:    09/22/22 0447   BP: 139/79   Pulse: 63   Resp: 15   Temp: 97.7 °F (36.5 °C)   SpO2: 96%      Gen Alert, coop, no distress Heart  Rrr, no mrg   Head NC, AT, no abnorm Abd  Soft, NT, +BS, no mass, no OM   Eyes PER, conj/corn clear Ext  Ext nl, AT, no C/C/E   Nose Nares nl, no drain, NT Pulse 2+ and symmetric   Throat Lips, mucosa, tongue nl Skin Col/text/turg nl, no vis rash/les   Neck S/S, TM, NT, no bruit/JVD Psych Nl mood and affect   Lung CTA-B, unlabored, no DTP Lymph   No cervical or axillary LA   Ch wall NT, no deform Neuro  Nl gross M/S exam      ASSESSMENT AND PLAN     *CAD   Date EF Results   Sx   Mixed typical, atypical   LHC   Hx PCI   MPI   None available   TTE   None available   Plan   Echo, stress pending - await results  Avoid contrast now with acute renal failure   *CKD  Status Prior creatinine normal 2019   Plan Hydration, renal consultation, avoid LHC at this time with renal function

## 2022-09-22 NOTE — PROGRESS NOTES
hours) at 9/22/2022 0835  Last data filed at 9/22/2022 0630  Gross per 24 hour   Intake 240 ml   Output 1020 ml   Net -780 ml       Physical Exam Performed:    /79   Pulse 63   Temp 97.7 °F (36.5 °C) (Oral)   Resp 15   Ht 5' 10\" (1.778 m)   Wt 212 lb 12.8 oz (96.5 kg)   SpO2 96%   BMI 30.53 kg/m²     General appearance: No apparent distress, appears stated age and cooperative. HEENT: Pupils equal, round, and reactive to light. Conjunctivae/corneas clear. Neck: Supple, with full range of motion. No jugular venous distention. Trachea midline. Respiratory:  Normal respiratory effort. Clear to auscultation, bilaterally without Rales/Wheezes/Rhonchi. Cardiovascular: Regular rate and rhythm with normal S1/S2 without murmurs, rubs or gallops. Abdomen: Soft, non-tender, non-distended with normal bowel sounds. Musculoskeletal: No clubbing, cyanosis or edema bilaterally. Full range of motion without deformity. Skin: Skin color, texture, turgor normal.  No rashes or lesions. Neurologic:  Neurovascularly intact without any focal sensory/motor deficits. Cranial nerves: II-XII intact, grossly non-focal.  Psychiatric: Alert and oriented, thought content appropriate, normal insight  Capillary Refill: Brisk, 3 seconds, normal   Peripheral Pulses: +2 palpable, equal bilaterally       Labs:   Recent Labs     09/21/22 1827 09/22/22 0421   WBC 7.6 6.0   HGB 14.4 13.8   HCT 41.7 40.9    207     Recent Labs     09/21/22 1827 09/22/22 0421   * 135*   K 4.7 4.1    101   CO2 23 23   BUN 14 20   CREATININE 2.3* 1.9*   CALCIUM 9.6 9.3     Recent Labs     09/21/22 1827   AST 22   ALT 25   BILITOT 0.9   ALKPHOS 44     No results for input(s): INR in the last 72 hours.   Recent Labs     09/21/22 1827 09/21/22 2127 09/22/22 0421   TROPONINI <0.01 <0.01 <0.01       Urinalysis:      Lab Results   Component Value Date/Time    NITRU Negative 01/11/2020 02:37 PM    [address] Four[address] 0-2 01/11/2020 02:37 PM    RBCUA None seen 01/11/2020 02:37 PM    BLOODU Negative 01/11/2020 02:37 PM    SPECGRAV 1.020 01/11/2020 02:37 PM    GLUCOSEU Negative 01/11/2020 02:37 PM       Radiology:  XR CHEST PORTABLE   Final Result   No acute cardiopulmonary findings. Assessment/Plan:    Chest pain / ACS rule out  2. CAD (s/p stents 2016)  3. Essential hypertension  4. Alcohol abuse     Anginal type pain, somewhat atypical in presentation, though with increased duration and hx of ischemic disease. Initial troponin negative. No further CP since arrival.      - check lipids, A1c, TSH, Mg   - ECHO today  - continue daily ASA  - lovenox 1mg/kg BID  - continue home BB, ACEi  - nitroglycerin SL prn   - repeat EKG PRN for chest pain or troponin increases  - keep O2 > 92%  - Await cardiology eval  -nephro consult  -CIWA scores        Dispo: Admit to Observation telemetry. Expected LOS: less than 2 midnights.   PPx:      lovenox  PT/OT:    Not indicated   Code status:  Full    Appropriate for A1 Discharge Unit: No      La Nena Green MD

## 2022-09-22 NOTE — PROGRESS NOTES
Patient instructed on Leonides Protocol Stress Test Procedure including possible side effects and adverse reactions. Verbalizes knowledge and understanding and denies having any questions.

## 2022-09-23 VITALS
TEMPERATURE: 98.7 F | DIASTOLIC BLOOD PRESSURE: 97 MMHG | WEIGHT: 211.9 LBS | BODY MASS INDEX: 30.34 KG/M2 | HEIGHT: 70 IN | SYSTOLIC BLOOD PRESSURE: 159 MMHG | OXYGEN SATURATION: 96 % | HEART RATE: 56 BPM | RESPIRATION RATE: 16 BRPM

## 2022-09-23 LAB
A/G RATIO: 1.3 (ref 1.1–2.2)
ALBUMIN SERPL-MCNC: 3.7 G/DL (ref 3.4–5)
ALP BLD-CCNC: 40 U/L (ref 40–129)
ALT SERPL-CCNC: 24 U/L (ref 10–40)
ANION GAP SERPL CALCULATED.3IONS-SCNC: 10 MMOL/L (ref 3–16)
ANION GAP SERPL CALCULATED.3IONS-SCNC: 9 MMOL/L (ref 3–16)
AST SERPL-CCNC: 20 U/L (ref 15–37)
BASOPHILS ABSOLUTE: 0 K/UL (ref 0–0.2)
BASOPHILS RELATIVE PERCENT: 0.9 %
BILIRUB SERPL-MCNC: 0.6 MG/DL (ref 0–1)
BILIRUBIN URINE: NEGATIVE
BLOOD, URINE: NEGATIVE
BUN BLDV-MCNC: 16 MG/DL (ref 7–20)
BUN BLDV-MCNC: 16 MG/DL (ref 7–20)
CALCIUM SERPL-MCNC: 9.2 MG/DL (ref 8.3–10.6)
CALCIUM SERPL-MCNC: 9.3 MG/DL (ref 8.3–10.6)
CHLORIDE BLD-SCNC: 107 MMOL/L (ref 99–110)
CHLORIDE BLD-SCNC: 108 MMOL/L (ref 99–110)
CLARITY: CLEAR
CO2: 21 MMOL/L (ref 21–32)
CO2: 22 MMOL/L (ref 21–32)
COLOR: YELLOW
CREAT SERPL-MCNC: 1 MG/DL (ref 0.8–1.3)
CREAT SERPL-MCNC: 1 MG/DL (ref 0.8–1.3)
CREATININE URINE: 101 MG/DL (ref 39–259)
EKG ATRIAL RATE: 71 BPM
EKG DIAGNOSIS: NORMAL
EKG P AXIS: 67 DEGREES
EKG P-R INTERVAL: 208 MS
EKG Q-T INTERVAL: 422 MS
EKG QRS DURATION: 92 MS
EKG QTC CALCULATION (BAZETT): 458 MS
EKG R AXIS: 63 DEGREES
EKG T AXIS: 51 DEGREES
EKG VENTRICULAR RATE: 71 BPM
EOSINOPHILS ABSOLUTE: 0.1 K/UL (ref 0–0.6)
EOSINOPHILS RELATIVE PERCENT: 2.5 %
GFR AFRICAN AMERICAN: >60
GFR AFRICAN AMERICAN: >60
GFR NON-AFRICAN AMERICAN: >60
GFR NON-AFRICAN AMERICAN: >60
GLUCOSE BLD-MCNC: 106 MG/DL (ref 70–99)
GLUCOSE BLD-MCNC: 107 MG/DL (ref 70–99)
GLUCOSE URINE: NEGATIVE MG/DL
HCT VFR BLD CALC: 39.6 % (ref 40.5–52.5)
HEMOGLOBIN: 13.6 G/DL (ref 13.5–17.5)
KETONES, URINE: NEGATIVE MG/DL
LEFT VENTRICULAR EJECTION FRACTION MODE: NORMAL
LEUKOCYTE ESTERASE, URINE: NEGATIVE
LV EF: 60 %
LYMPHOCYTES ABSOLUTE: 1 K/UL (ref 1–5.1)
LYMPHOCYTES RELATIVE PERCENT: 22.2 %
MCH RBC QN AUTO: 33.4 PG (ref 26–34)
MCHC RBC AUTO-ENTMCNC: 34.2 G/DL (ref 31–36)
MCV RBC AUTO: 97.6 FL (ref 80–100)
MICROSCOPIC EXAMINATION: NORMAL
MONOCYTES ABSOLUTE: 0.4 K/UL (ref 0–1.3)
MONOCYTES RELATIVE PERCENT: 8.8 %
NEUTROPHILS ABSOLUTE: 2.9 K/UL (ref 1.7–7.7)
NEUTROPHILS RELATIVE PERCENT: 65.6 %
NITRITE, URINE: NEGATIVE
PDW BLD-RTO: 13.3 % (ref 12.4–15.4)
PH UA: 6 (ref 5–8)
PLATELET # BLD: 196 K/UL (ref 135–450)
PMV BLD AUTO: 7.8 FL (ref 5–10.5)
POC ACT LR: 194 SEC
POTASSIUM REFLEX MAGNESIUM: 4 MMOL/L (ref 3.5–5.1)
POTASSIUM SERPL-SCNC: 4.1 MMOL/L (ref 3.5–5.1)
PROTEIN UA: NEGATIVE MG/DL
RBC # BLD: 4.06 M/UL (ref 4.2–5.9)
SODIUM BLD-SCNC: 138 MMOL/L (ref 136–145)
SODIUM BLD-SCNC: 139 MMOL/L (ref 136–145)
SODIUM URINE: 138 MMOL/L
SPECIFIC GRAVITY UA: >=1.03 (ref 1–1.03)
TOTAL CK: 33 U/L (ref 39–308)
TOTAL PROTEIN: 6.5 G/DL (ref 6.4–8.2)
URINE TYPE: NORMAL
UROBILINOGEN, URINE: 0.2 E.U./DL
WBC # BLD: 4.5 K/UL (ref 4–11)

## 2022-09-23 PROCEDURE — 93571 IV DOP VEL&/PRESS C FLO 1ST: CPT | Performed by: INTERNAL MEDICINE

## 2022-09-23 PROCEDURE — 85025 COMPLETE CBC W/AUTO DIFF WBC: CPT

## 2022-09-23 PROCEDURE — 93458 L HRT ARTERY/VENTRICLE ANGIO: CPT | Performed by: INTERNAL MEDICINE

## 2022-09-23 PROCEDURE — C1894 INTRO/SHEATH, NON-LASER: HCPCS

## 2022-09-23 PROCEDURE — 82550 ASSAY OF CK (CPK): CPT

## 2022-09-23 PROCEDURE — 6360000004 HC RX CONTRAST MEDICATION: Performed by: INTERNAL MEDICINE

## 2022-09-23 PROCEDURE — 2709999900 HC NON-CHARGEABLE SUPPLY

## 2022-09-23 PROCEDURE — 99152 MOD SED SAME PHYS/QHP 5/>YRS: CPT | Performed by: INTERNAL MEDICINE

## 2022-09-23 PROCEDURE — 81003 URINALYSIS AUTO W/O SCOPE: CPT

## 2022-09-23 PROCEDURE — 2500000003 HC RX 250 WO HCPCS

## 2022-09-23 PROCEDURE — 99233 SBSQ HOSP IP/OBS HIGH 50: CPT | Performed by: INTERNAL MEDICINE

## 2022-09-23 PROCEDURE — 93010 ELECTROCARDIOGRAM REPORT: CPT | Performed by: INTERNAL MEDICINE

## 2022-09-23 PROCEDURE — 6370000000 HC RX 637 (ALT 250 FOR IP): Performed by: INTERNAL MEDICINE

## 2022-09-23 PROCEDURE — 4A023N7 MEASUREMENT OF CARDIAC SAMPLING AND PRESSURE, LEFT HEART, PERCUTANEOUS APPROACH: ICD-10-PCS | Performed by: INTERNAL MEDICINE

## 2022-09-23 PROCEDURE — 82570 ASSAY OF URINE CREATININE: CPT

## 2022-09-23 PROCEDURE — 84300 ASSAY OF URINE SODIUM: CPT

## 2022-09-23 PROCEDURE — 2580000003 HC RX 258: Performed by: INTERNAL MEDICINE

## 2022-09-23 PROCEDURE — 36415 COLL VENOUS BLD VENIPUNCTURE: CPT

## 2022-09-23 PROCEDURE — 6360000002 HC RX W HCPCS

## 2022-09-23 PROCEDURE — 99152 MOD SED SAME PHYS/QHP 5/>YRS: CPT

## 2022-09-23 PROCEDURE — 4A033BC MEASUREMENT OF ARTERIAL PRESSURE, CORONARY, PERCUTANEOUS APPROACH: ICD-10-PCS | Performed by: INTERNAL MEDICINE

## 2022-09-23 PROCEDURE — 85347 COAGULATION TIME ACTIVATED: CPT

## 2022-09-23 PROCEDURE — 80053 COMPREHEN METABOLIC PANEL: CPT

## 2022-09-23 PROCEDURE — 93458 L HRT ARTERY/VENTRICLE ANGIO: CPT

## 2022-09-23 PROCEDURE — B2111ZZ FLUOROSCOPY OF MULTIPLE CORONARY ARTERIES USING LOW OSMOLAR CONTRAST: ICD-10-PCS | Performed by: INTERNAL MEDICINE

## 2022-09-23 PROCEDURE — C1769 GUIDE WIRE: HCPCS

## 2022-09-23 PROCEDURE — 99153 MOD SED SAME PHYS/QHP EA: CPT

## 2022-09-23 PROCEDURE — 93571 IV DOP VEL&/PRESS C FLO 1ST: CPT

## 2022-09-23 RX ORDER — AMLODIPINE BESYLATE 5 MG/1
10 TABLET ORAL DAILY
Status: DISCONTINUED | OUTPATIENT
Start: 2022-09-24 | End: 2022-09-23 | Stop reason: HOSPADM

## 2022-09-23 RX ORDER — THIAMINE MONONITRATE (VIT B1) 100 MG
100 TABLET ORAL DAILY
Qty: 30 TABLET | Refills: 3 | Status: SHIPPED | OUTPATIENT
Start: 2022-09-23

## 2022-09-23 RX ADMIN — CARVEDILOL 25 MG: 25 TABLET, FILM COATED ORAL at 17:01

## 2022-09-23 RX ADMIN — IOPAMIDOL 60 ML: 755 INJECTION, SOLUTION INTRAVENOUS at 15:04

## 2022-09-23 RX ADMIN — SODIUM CHLORIDE: 9 INJECTION, SOLUTION INTRAVENOUS at 02:27

## 2022-09-23 ASSESSMENT — PAIN SCALES - GENERAL
PAINLEVEL_OUTOF10: 0
PAINLEVEL_OUTOF10: 0

## 2022-09-23 NOTE — PROGRESS NOTES
Nephrology Consult Note  [phone]   [facility]. [facility]      Consult received. Full note to follow. Thank you for consulting [facility]. Please call with questions.

## 2022-09-23 NOTE — CONSULTS
Nephrology Consult Note  [phone]   [facility]. com        Reason for Consult:  BETTY    HISTORY OF PRESENT ILLNESS:                This is a patient with significant past medical history of HTN,  CAD, who presented with CP/SOB. He has a prior h/o MI and is s/p stents. He denies any N/V/D/. He reports occasional NSAIDs. He denies any difficulty urinating. He is on Lisinopril for HTN. Labs in ED showed Cr of 2.3, started on IVF. Seen by cardiology, LHC was planned but held due to BETTY. Last Cr 2019 1.23. Tp negative  -started on IVF  -Cr this am is 1.0    Past Medical History:        Diagnosis Date    CAD (coronary artery disease)     Hyperlipidemia     Hypertension     MI (myocardial infarction) ([facility] [address]     3 stents placed       Past Surgical History:        Procedure Laterality Date    CARDIAC SURGERY         Current Medications:    No current facility-administered medications on file prior to encounter. Current Outpatient Medications on File Prior to Encounter   Medication Sig Dispense Refill    lisinopril (PRINIVIL;ZESTRIL) 40 MG tablet Take 40 mg by mouth daily 1/2 TABLET DAILY      tadalafil (CIALIS) 10 MG tablet Take 10 mg by mouth as needed for Erectile Dysfunction      fenofibrate micronized (LOFIBRA) 67 MG capsule Take 67 mg by mouth 2 times daily      lansoprazole (PREVACID) 15 MG delayed release capsule Take 15 mg by mouth daily      atorvastatin (LIPITOR) 40 MG tablet Take 40 mg by mouth daily      sertraline (ZOLOFT) 50 MG tablet Take 50 mg by mouth daily      amLODIPine (NORVASC) 5 MG tablet Take 5 mg by mouth daily      carvedilol (COREG) 25 MG tablet Take 25 mg by mouth 2 times daily (with meals)      dutasteride-tamsulosin 0.5-0.4 MG CAPS Take by mouth      aspirin 81 MG chewable tablet Take 81 mg by mouth daily         Allergies:  Patient has no known allergies.     Social History:    Social History     Socioeconomic History    Marital status:      Spouse name: Not on file    Number of children: Not on file    Years of education: Not on file    Highest education level: Not on file   Occupational History    Not on file   Tobacco Use    Smoking status: Former    Smokeless tobacco: Never   Vaping Use    Vaping Use: Not on file   Substance and Sexual Activity    Alcohol use: Yes     Comment: daily bourbon    Drug use: Never    Sexual activity: Not on file   Other Topics Concern    Not on file   Social History Narrative    Not on file     Social Determinants of Health     Financial Resource Strain: Not on file   Food Insecurity: Not on file   Transportation Needs: Not on file   Physical Activity: Not on file   Stress: Not on file   Social Connections: Not on file   Intimate Partner Violence: Not on file   Housing Stability: Not on file       Family History:   History reviewed. No pertinent family history. Review of Systems:  a comprehensive Review of systems was negative except as noted in HPI     PHYSICAL EXAM:    Vitals:    BP (!) 167/93   Pulse 61   Temp 97.9 °F (36.6 °C) (Oral)   Resp 16   Ht 5' 10\" (1.778 m)   Wt 211 lb 14.4 oz (96.1 kg)   SpO2 95%   BMI 30.40 kg/m²   I/O last 3 completed shifts: In: 480 [P.O.:480]  Out: 1020 [Urine:1020]  No intake/output data recorded. Physical Exam:  Gen: Resting in bed, NAD. HEENT: MMM, OP clear. CV: RRR no m/r/g. No S3.  Lungs: Good respiratory effort, clear air entry   Abd: S/NT +BS  Ext: No edema, no cyanosis  Skin: Warm. No rashes appreciated. : No TTP over bladder, nondistended. Neuro: Alert and oriented x 3, nonfocal.  Joints: No erythema noted over joints.     DATA:    CBC:   Lab Results   Component Value Date/Time    WBC 4.5 09/23/2022 09:02 AM    RBC 4.06 09/23/2022 09:02 AM    HGB 13.6 09/23/2022 09:02 AM    HCT 39.6 09/23/2022 09:02 AM    MCV 97.6 09/23/2022 09:02 AM    MCH 33.4 09/23/2022 09:02 AM    MCHC 34.2 09/23/2022 09:02 AM    RDW 13.3 09/23/2022 09:02 AM     09/23/2022 09:02 AM    MPV 7.8 09/23/2022 09:02 AM     BMP:    Lab Results   Component Value Date/Time     09/23/2022 09:02 AM     09/23/2022 09:02 AM    K 4.0 09/23/2022 09:02 AM    K 4.1 09/23/2022 09:02 AM     09/23/2022 09:02 AM     09/23/2022 09:02 AM    CO2 21 09/23/2022 09:02 AM    CO2 22 09/23/2022 09:02 AM    BUN 16 09/23/2022 09:02 AM    BUN 16 09/23/2022 09:02 AM    LABALBU 3.7 09/23/2022 09:02 AM    CREATININE 1.0 09/23/2022 09:02 AM    CREATININE 1.0 09/23/2022 09:02 AM    CALCIUM 9.3 09/23/2022 09:02 AM    CALCIUM 9.2 09/23/2022 09:02 AM    GFRAA >60 09/23/2022 09:02 AM    GFRAA >60 09/23/2022 09:02 AM    LABGLOM >60 09/23/2022 09:02 AM    LABGLOM >60 09/23/2022 09:02 AM    GLUCOSE 107 09/23/2022 09:02 AM    GLUCOSE 106 09/23/2022 09:02 AM       IMPRESSION/RECOMMENDATIONS:      BETTY: no recent baseline, last outpatient Cr 1.23, eGFR 60. Likely hemodynamic  -resolved with IVH, Cr is 1.0  -check UA, UPC  -hold lisinopirl  -further work up based on urinary studies  -plan for kidney US after [facility] completed    Chest pain: plan for [facility]-Cr 1.0 - given no h/o DM, CHF, current age, risk of CA-BETTY 8-10% with risk of dialysis requiring BETTY < 1%-OK to proceed from nephrology standpoint-patient is agreeable    HTN: hold lisinopril and increase amlodipine    Decreased GFR: eGFR in the past noted to be in 60's-suspect actual GFR higher  -will check kidney US, SPEP/UPEP-can be done as outpatient  -will arrange for nephrology follow up with me    ETOH abuse: Thank you for allowing me to participate in the care of this patient. I will continue to follow along. Please call with questions.     Sonya Peace MD

## 2022-09-23 NOTE — PROGRESS NOTES
[address]  Daily Progress Note    Admit 9/21/2022   CC CAD, CKD   Subjective Doing well this morning. Renal pending. Exam BP (!) 167/93   Pulse 61   Temp 97.9 °F (36.6 °C) (Oral)   Resp 16   Ht 5' 10\" (1.778 m)   Wt 211 lb 14.4 oz (96.1 kg)   SpO2 95%   BMI 30.40 kg/m²    Intake/Output Summary (Last 24 hours) at 9/23/2022 4672  Last data filed at 9/23/2022 0025  Gross per 24 hour   Intake 240 ml   Output --   Net 240 ml     Gen Alert, coop, no distress Heart  Rrr, no mrg   Head NC, AT, no abnorm Abd  Soft, NT, +BS, no mass, no OM   Eyes PER, conj/corn clear Ext  Ext nl, AT, no C/C/E   Nose Nares nl, no drain, NT Pulse 2+ and symmetric   Throat Lips, mucosa, tongue nl Skin Col/text/turg nl, no vis rash/les   Neck S/S, TM, NT, no bruit/JVD Psych Nl mood and affect   Lung CTA-B, unlabored, no DTP Lymph   No cervical or axillary LA   Ch wall NT, no deform Neuro  Nl gross M/S exam      Medications  amLODIPine  5 mg Oral Daily    aspirin  81 mg Oral Daily    atorvastatin  40 mg Oral Daily    carvedilol  25 mg Oral BID WC    pantoprazole  40 mg Oral QAM AC    [Held by provider] lisinopril  20 mg Oral Daily    sertraline  50 mg Oral Daily    sodium chloride flush  5-40 mL IntraVENous 2 times per day    thiamine  100 mg Oral Daily    enoxaparin  40 mg SubCUTAneous Daily    nitroglycerin  1 inch Topical Once      sodium chloride 80 mL/hr at 09/23/22 0227    sodium chloride        Labs Relevant and available CV data reviewed   Telemetry Reviewed   Time More than 35 minutes spent reviewing patient chart (including but not limited to notes, labs, imaging and other testing), interviewing patient and/or family members, performing a physical exam, documentation of my findings above and subsequent follow-up of ordered testing. More than 50% of that time spent face to face with patient discussing clinical condition and counseling regarding treatment plan.        A/P   *CAD   Date EF Results   Sx   Mixed typical, atypical   LHC   Hx PCI   MPI   None available   TTE   None available   Plan   Stress with abnormal perfusion and EKG  Avoid contrast now with acute renal failure  Possible LHC Monday if renal fxn improves   *CKD  Status Prior creatinine normal 2019   Plan Hydration, renal consultation, avoid LHC at this time with renal function  No renal panel yet this morning   *HTN  Status Uncontrolled   Plan Titrate antihypertensives to systolic <185

## 2022-09-23 NOTE — DISCHARGE INSTRUCTIONS
Radial Angiogram      Care of your puncture site:  Remove clear bandage 24 hours after the procedure. May shower in 24 hours  Inspect the site daily and gently clean using soap and water. Dry thoroughly and apply a Band-Aid. Normal Observations:  Soreness or tenderness which may last one week. Mild oozing from the incision site. Possible bruising that could last 2 weeks. Activity:  You may resume driving 24 hours following the procedure. You may resume normal activity in 3 days or after the wound heals. Avoid lifting more than 10 pounds for 3 days with affected arm. Nutrition:  Regular diet or General.  Drink at least 8 to 10 glasses of decaffeinated, non-alcoholic fluid for the next 24 hours to flush the x-ray dye used for your angiogram out of your body. Call your doctor immediately if your condition worsens, for any other concerns, for a follow-up appointment or if you experience any of the following:  Significant bleeding that does not stop after 10 minutes of applying firm pressure on the puncture site. Increased swelling of the wrist.  Unusual pain, numbness, or tingling of the wrist/arm. Any signs of infection such as: redness, yellow drainage at the site, swelling or pain. Other Instructions:  Hold Metformin or Metformin containing drugs for 48 hours after procedure.

## 2022-09-23 NOTE — PRE SEDATION
[facility] Cardiology  Brief Pre-Op Note/Sedation Assessment    Gary Carter  1955  [id number]  2:18 PM    Planned Procedure: Cardiac Catheterization Procedure  Post Procedure Plan: Return to same level of care  Consent:   I have discussed with the patient and/or the patient representative the indication, alternatives, and the possible risks and/or complications of the planned procedure and the anesthesia methods. The patient and/or patient representative appear to understand and agree to proceed. Chief Complaint:   Chest Pain/Pressure  Anginal Equivalent  Dyspnea on Exertion  Dyspnea  Fatigue     Indications for Procedure:  Presentation New Onset Angina <= 2 months and Worsening Angina   Anginal Class (2 wks) CCS III - Symptoms with everyday living activities, i.e., moderate limitation   Anginal Sx Typical CP   CHF Class (2wks) No symptoms   CV Instability Yes     Prior Ischemic Workup/Eval:  Pre-Proc Meds Yes: ACE/ARB/ARNI, Aspirin, Beta Blockers, and Ca Channel Blockersstatin   Stress Test? Yes:  Stress or Imaging Studies Performed (within ANY time period):   Type:  Stress Nuclear  Results:  Positive:  Ischemia on ECG and Myocardial Perfusion Defects (Nuclear) Extent of Ischemia:  Intermediate     Does Patient need surgery? Cardiac Valve Surgery No     Pre-Procedure Medical History:  Vital Signs BP (!) 161/96   Pulse 63   Temp 97.7 °F (36.5 °C) (Oral)   Resp 16   Ht 5' 10\" (1.778 m)   Wt 211 lb 14.4 oz (96.1 kg)   SpO2 95%   BMI 30.40 kg/m²    Allergies Reviewed in EMR   Medications Reviewed in EMR   Past Med Hx Reviewed in EMR   Surg Hx Reviewed in EMR     Pre-Sedation:  Pre-Sedation Exam I have assessed the patient and reviewed the H&P on the chart. Hx Anesthesia Comps None   Mod Mallampati II   ASA Class Class 2 - A normal healthy patient with mild systemic disease     Patricia Scale:   Activity 2 - Able to move 4 extrem on command   Respiration 2 - Able to breath deeply and cough freely Circulation 2 - BP +/- 20mmHg of normal   Consciousness 2 - Fully awake   Oxygen Saturation 2 - Able to maintain oxygen sat >92% on room air     Sedation/Anesthesia Plan:  Guard patient safety and welfare. Minimize physical discomfort and pain. Minimize negative psychological responses to treatment by providing sedation and analgesia and maximize the potential amnesia. Patient to meet pre-procedure discharge plan.      Medication Planned:  Midazolam intravenously and fentanyl intravenously     Patient is an appropriate candidate for plan of sedation:   Yes    Electronically signed by Fela Martinez MD on 9/23/2022 at 2:18 PM

## 2022-09-23 NOTE — DISCHARGE SUMMARY
Current Discharge Medication List        Current Discharge Medication List        CONTINUE these medications which have NOT CHANGED    Details   lisinopril (PRINIVIL;ZESTRIL) 40 MG tablet Take 40 mg by mouth daily 1/2 TABLET DAILY      tadalafil (CIALIS) 10 MG tablet Take 10 mg by mouth as needed for Erectile Dysfunction      fenofibrate micronized (LOFIBRA) 67 MG capsule Take 67 mg by mouth 2 times daily      lansoprazole (PREVACID) 15 MG delayed release capsule Take 15 mg by mouth daily      atorvastatin (LIPITOR) 40 MG tablet Take 40 mg by mouth daily      sertraline (ZOLOFT) 50 MG tablet Take 50 mg by mouth daily      amLODIPine (NORVASC) 5 MG tablet Take 5 mg by mouth daily      carvedilol (COREG) 25 MG tablet Take 25 mg by mouth 2 times daily (with meals)      dutasteride-tamsulosin 0.5-0.4 MG CAPS Take by mouth      aspirin 81 MG chewable tablet Take 81 mg by mouth daily           Current Discharge Medication List          Discharge ROS:  A complete review of systems was asked and negative except for above. Discharge Exam:    BP (!) 159/97   Pulse 56   Temp 98.7 °F (37.1 °C)   Resp 16   Ht 5' 10\" (1.778 m)   Wt 211 lb 14.4 oz (96.1 kg)   SpO2 96%   BMI 30.40 kg/m²     General appearance: No apparent distress, appears stated age and cooperative. HEENT: Pupils equal, round, and reactive to light. Conjunctivae/corneas clear. Neck: Supple, with full range of motion. No jugular venous distention. Trachea midline. Respiratory:  Normal respiratory effort. Clear to auscultation, bilaterally without Rales/Wheezes/Rhonchi. Cardiovascular: Regular rate and rhythm with normal S1/S2 without murmurs, rubs or gallops. Abdomen: Soft, non-tender, non-distended with normal bowel sounds. Musculoskeletal: No clubbing, cyanosis or edema bilaterally. Full range of motion without deformity. Skin: Skin color, texture, turgor normal.  No rashes or lesions.   Neurologic:  Neurovascularly intact without any focal Physician           REECE, , [facility]  Procedure Type of Study   TTE procedure:ECHOCARDIOGRAM COMPLETE 2D W DOPPLER W COLOR. Procedure Date Date: 09/22/2022 Start: 08:20 AM Study Location: [facility] - Echo Lab Technical Quality: Adequate visualization Indications:Chest pain. Patient Status: Routine Height: 70 inches Weight: 210 pounds BSA: 2.13 m2 BMI: 30.13 kg/m2 BP: 139/79 mmHg  Conclusions   Summary  Left ventricular systolic function is normal with ejection fraction  estimated at 50-55%. No regional wall motion abnormalities are noted. There is mild concentric left ventricular hypertrophy. Grade I diastolic dysfunction. Unable to estimate pulmonary artery pressure secondary to incomplete TR jet  envelope. Signature   ------------------------------------------------------------------  Electronically signed by Gray Woodard [facility]  (Interpreting physician) on 09/22/2022 at 01:13 PM  ------------------------------------------------------------------   Findings   Left Ventricle  Left ventricular systolic function is normal with ejection fraction  estimated at 50-55%. No regional wall motion abnormalities are noted. There is mild concentric left ventricular hypertrophy. Left ventricle size is normal.  Grade I diastolic dysfunction. Mitral Valve  Mitral valve is structurally normal.  Trivial mitral regurgitation. Left Atrium  The left atrium is normal in size. Aortic Valve  The aortic valve is normal in structure and function. No evidence of aortic valve regurgitation. Aorta  The aortic root is normal in size. The ascending aorta is mildly dilated. Right Ventricle  The right ventricular size is normal.   Tricuspid Valve  Tricuspid valve is structurally normal.  Trivial tricuspid regurgitation. Right Atrium  The right atrium is normal in size. Pulmonic Valve  The pulmonic valve is normal in structure and function. No evidence of pulmonic valve regurgitation. Pericardial Effusion  No pericardial effusion noted. Pleural Effusion  No pleural effusion noted. Miscellaneous  IVC size is dilated (>2.1 cm) but collapses > 50% with respiration  consistent with elevated RA pressure (8 mmHg). Unable to estimate pulmonary artery pressure secondary to incomplete TR jet  envelope. M-Mode/2D Measurements (cm)   LV Diastolic Dimension: 2.94 cm LV Systolic Dimension: 0.00 cm  LV Septum Diastolic: 8.43 cm  LV PW Diastolic: 7.79 cm        AO Root Dimension: 3.4 cm                                   LA Area: 12.5 cm2  LVOT: 2.5 cm                    LA volume/Index: 41.4 ml /19 ml/m2  Doppler Measurements   AV Peak Velocity: 93 cm/s      MV Peak E-Wave: 52.6 cm/s  AV Peak Gradient: 3.46 mmHg    MV Peak A-Wave: 85.1 cm/s  AV Mean Gradient: 2 mmHg       MV E/A Ratio: 0.62  LVOT Peak Velocity: 73.5 cm/s  AV Area (Continuity):3.57 cm2   E' Septal Velocity: 3.81 cm/s  E' Lateral Velocity: 4.78 cm/s  E/E' ratio: 12.4   Aortic Valve   Peak Velocity: 93 cm/s      Mean Velocity: 63.4 cm/s  Peak Gradient: 3.46 mmHg    Mean Gradient: 2 mmHg  Area (continuity): 3.57 cm2  AV VTI: 21.6 cm  Aorta   Aortic Root: 3.4 cm     Aortic Arch: 3.2 cm  Ascending Aorta: 3.8 cm  LVOT Diameter: 2.5 cm      XR CHEST PORTABLE    Result Date: 9/21/2022  EXAMINATION: ONE XRAY VIEW OF THE CHEST 9/21/2022 6:29 pm COMPARISON: Chest x-ray dated 11 January 2020 HISTORY: ORDERING SYSTEM PROVIDED HISTORY: sob TECHNOLOGIST PROVIDED HISTORY: Reason for exam:->sob Reason for Exam: sob FINDINGS: Mild cardiomegaly. No acute airspace infiltrate. No pneumothorax or pleural effusion. No acute cardiopulmonary findings.      NM Cardiac Stress Test Nuclear Imaging    Result Date: 9/22/2022  Cardiac Perfusion Imaging  Demographics   Patient Name      Trey Moncada   Date of Study     09/22/2022         Gender              Male   Patient Number    [id number]         Date of Birth       1955   Visit Number      [id number] Age                 74 year(s)   Accession Number  [id number]         Room Number         9652   Corporate ID      [id number]             NM Technician       Nidia Estrella, ARRT   Nurse             Bren Stover,  Interpreting        Jenean Opitz, MD                    RN                 Physician   Ordering          Danna Rausch,   Stress Interpreting Jenean Opitz, MD  Physician         MD                 Physician   The procedure was explained in detail to the patient. Risks,  complications and alternative treatments were reviewed. Written consent  was obtained. Procedure Procedure Type:   Nuclear Stress Test:Exercise, NM MYOCARDIAL SPECT REST EXERCISE OR RX   Study location: [facility] - Nuclear Medicine   Indications: Chest pain. Hospital Status: Outpatient. Height: 70 inches Weight: 210 pounds  Risk Factors   The patient risk factors include:prior PCI;obesity, former tobacco use,  treated and controlled hypercholesterolemia, treated and controlled  hypertension, family history of premature CAD, dyslipidemia, prior MI and (  years not smokin). Conclusions   Summary  *Equivocal exercise EKG for ischemia with 1mm inferior ST depression  *Normal LV size and function with EF of 62%  *SPECT imaging with moderate sized, severe intensity decrease in tracer  distribution in the inferolateral wall with stress and rest consistent with  scar. No significant reversible ischemia. Impression  *Normal LV function with EF 62%  *Abnormal myocardial perfusion with inferolateral scar.   Stress Protocols   Resting ECG  NSR   Resting HR:61 bpm  Resting BP:151/91 mmHg   Pre-stress physical exam: gxt  Stress Protocol:Exercise - Leonides  Peak HR:139 bpm                            HR/BP product:[id number]  Peak BP:215/76 mmHg                        Max exercise: 7 METS  Predicted HR: 155 bpm  % of predicted HR: 90  Test duration:4 min and 45 sec  Reason for termination:Physiologic Maximum   ECG Findings  Sinus tachycardia, 1mm inferolateral ST depression   Symptoms  There was stress induced shortness of breath. Symptoms resolved with rest.  Denies any chest pain or discomfort. Complications  Procedure complication was none. Stress Interpretation  Equivocal exercise EKG for ischemia   Imaging Protocols   - One Day   Rest                          Stress   Isotope:Myoview/Tetrofosmin   Isotope: Myoview/Tetrofosmin  Isotope dose:11.5 mCi         Isotope dose:33.2 mCi  Administration Route:I.V.      Administration Route:I.V.  Date:09/22/2022 11:05         Date:09/22/2022 12:05                                 Technique:      Gated  Imaging Results    Stress ejection    Ejection fraction:62 %    EDV :129 ml    ESV :49 ml    Stroke volume :80 ml    LV mass :153 gr  Medical History   Additional Medical History   lisinopril (PRINIVIL;ZESTRIL) 40 MG tablet Take 40 mg by mouth  daily 1/2 TABLET DAILY  tadalafil (CIALIS) 10 MG tablet Take 10 mg by mouth as needed  for Erectile Dysfunction  fenofibrate micronized (LOFIBRA) 67 MG capsule Take 67 mg by  mouth 2 times daily  lansoprazole (PREVACID) 15 MG delayed release capsule Take 15 mg  by mouth daily  atorvastatin (LIPITOR) 40 MG tablet Take 40 mg by mouth daily  sertraline (ZOLOFT) 50 MG tablet Take 50 mg by mouth daily  amLODIPine (NORVASC) 5 MG tablet Take 5 mg by mouth daily  carvedilol (COREG) 25 MG tablet Take 25 mg by mouth 2 times  daily (with meals)  dutasteride-tamsulosin 0.5-0.4 MG CAPS Take by mouth  aspirin 81 MG chewable tablet   Signatures   ------------------------------------------------------------------  Electronically signed by Alin Rogers MD (Interpreting  physician) on 09/22/2022 at 14:04  ------------------------------------------------------------------        EKG     Rhythm: normal sinus   Rate: normal  Clinical Impression: no acute changes        The patient was seen and examined on day of discharge and this discharge summary is in conjunction with any daily progress note from day of discharge. Time Spent on discharge is 30 minutes  in the examination, evaluation, counseling and review of medications and discharge plan. Note that more than 30 minutes was spent in preparing discharge papers, discussing discharge with patient, medication review, etc.       Signed:    1202 Tonny Callahan MD   9/23/2022      Thank you Taylor Cooper MD for the opportunity to be involved in this patient's care.  If you have any questions or concerns please feel free to contact me at [facility]

## 2022-09-23 NOTE — PLAN OF CARE
Problem: Discharge Planning  Goal: Discharge to home or other facility with appropriate resources  Outcome: Progressing     Problem: ABCDS Injury Assessment  Goal: Absence of physical injury  Outcome: Progressing     Problem: Cardiovascular - Adult  Goal: Maintains optimal cardiac output and hemodynamic stability  Outcome: Progressing  Note: Echo done, EF 50-55%. Gentle IVF infusing. Plan for LHC in am. Npo at midnight.  I&O. Daily weight. Recheck CBC and BMP in am.  Cardiology and nephrology consulted. Telemetry monitoring.

## 2022-09-23 NOTE — OP NOTE
[address]   [facility] Operative Note     Procedure Summary  Procedure C, FFR RCA   Indication Abnormal stress   Consent Obtained   Access RRA   US US guidance used to determine artery patency, size (>2mm), anatomic variations and ideal puncture location. Real-time US utilized concurrent with vascular needle entry into artery. Image(s) permanently recorded and reported in chart. Bleed Risk Low   Sedation Minimal conscious sedation for patient comfort. Independent trained observer pushed medications at my direction. We monitored the patient's level of consciousness and vital signs/physiologic status throughout the procedure duration (see start and stop times above, as well as medication dosages).    Start Time 1402   Stop Time 1445   Versed 4mg   Fentanyl 150mcg   Contrast 60cc   Flouro 4.3   EBL <16RL   Complicat None   Specimens None     Findings  Artery Findings/Result   LM Normal   LAD Patent stents, stents jailed D2 and D3 with ostial 70% stenoses   Cx Normal   RI Mid 40%   RCA 60% prox, 30% mid PDA   LVEDP 10   LVG 60%     Intervention(s)  Artery Location Intervention Result   RCA Prox/mid FFR 0.90 = not significant     Post Cath Dx:   Patent stents  Nonobstructive CAD